# Patient Record
(demographics unavailable — no encounter records)

---

## 2025-01-28 NOTE — HISTORY OF PRESENT ILLNESS
[FreeTextEntry1] : rash [de-identified] : rash on trunk and extremities  had biopsy done on 12/26/24 that showed spongiotic dermatitis - differential includes early cutaneous T-cell lymphoma but differential also include nummular dermatitis/lichenoid dermatitis TCR positive

## 2025-01-28 NOTE — ASSESSMENT
[FreeTextEntry1] : Erythematous scaly patches outside biopsy showing a spongiotic dermatitis with  TCR + discussed differential of eczematous dermatitis vs atypical T-cell process vs true mycosis fungoides Plan to review outside biopsy here  labs done toady - CBC, CMP, LDH, flow cytometry  further tx pending results  - Discussed diagnosis of cutaneous T-cell lymphoma and mycosis fungoides in detail - MF is a subtype of CTCL  - Discussed overall indolent course in patch stage disease as patient has which can be managed with topicals and skin-directed therapy including NB-UVB.

## 2025-02-27 NOTE — HISTORY OF PRESENT ILLNESS
[FreeTextEntry1] : rash [de-identified] : rash on trunk and extremities  had biopsy done on 12/26/24 that showed spongiotic dermatitis with atypical T cell infiltrate. TCR positive  Pt notes ongoing spread of rough patches on body including behind knees, inguinal folds, axillae, abdomen/flanks and under breasts. No tx tried. Mostly asymptomatic, sometimes itchy on flanks and behind knees. Amenable to repeat biopsy today  flow cytometry negative

## 2025-02-27 NOTE — ASSESSMENT
[FreeTextEntry1] : # Neoplasm of uncertain behavior of skin, L inguinal fold # Atypical lymphocytic infiltrate - Bx from 12/26/24 (from outside derm) showing spongiotic dermatitis with atypical lymphocytic infiltrate, TCR+, flow cytometry neg.  discussed differential of eczematous dermatitis vs atypical T-cell process vs true mycosis fungoides - Repeat biopsy performed today (as below) labs done- CBC, CMP, LDH, flow cytometry -- all negative and wnl Punch biopsy procedure note Punch biopsy diameter: 4 mm Sutures used: 4-chromic gut  Alternatives to this procedure, benefits of, and risks including bleeding, scarring, and infection were explained to the patient. Informed consent was documented and a consent form was signed by the patient and surgeon. The lesion was cleaned with alcohol and anesthetized with 1% lidocaine with epinephrine. A sterile punch instrument was used to incise a circular piece of tissue into the superficial subcutaneous plane, and the wound was closed with sutures above. The specimen was sent to pathology. Vaseline and a bandage were applied. The patient tolerated the procedure well with minimal blood loss and no complications. Post-op instructions were given. There were no pre-op or post-op medications. The patient was instructed to keep the biopsy site dry and covered for 24 hours. After that, they may wash gently the area daily with mild soap and water and apply clean Vaseline and a Bandaid until it has healed. The patient was advised to contact us if they develop persistent bleeding, redness, swelling, increasing pain, or pus draining from the site. Patient's phone number was obtained for reporting of results. Patient agreed to leaving of message with results if phone is not answered.  RTC pending pathology results

## 2025-02-27 NOTE — PHYSICAL EXAM
[FreeTextEntry3] : thin pink circumscribed rough plaques and patches in L inguinal fold, L thigh, R hip, lower back, flanks, inframammary fold (R>L), L popliteal fossa, R dorsal foot  no palpable lymphadenopathy

## 2025-02-27 NOTE — HISTORY OF PRESENT ILLNESS
[FreeTextEntry1] : rash [de-identified] : rash on trunk and extremities  had biopsy done on 12/26/24 that showed spongiotic dermatitis with atypical T cell infiltrate. TCR positive  Pt notes ongoing spread of rough patches on body including behind knees, inguinal folds, axillae, abdomen/flanks and under breasts. No tx tried. Mostly asymptomatic, sometimes itchy on flanks and behind knees. Amenable to repeat biopsy today  flow cytometry negative